# Patient Record
Sex: MALE | Race: WHITE | NOT HISPANIC OR LATINO | URBAN - METROPOLITAN AREA
[De-identification: names, ages, dates, MRNs, and addresses within clinical notes are randomized per-mention and may not be internally consistent; named-entity substitution may affect disease eponyms.]

---

## 2023-04-30 ENCOUNTER — EMERGENCY (EMERGENCY)
Facility: HOSPITAL | Age: 83
LOS: 1 days | Discharge: HOME | End: 2023-04-30
Attending: EMERGENCY MEDICINE
Payer: MEDICARE

## 2023-04-30 VITALS
RESPIRATION RATE: 16 BRPM | WEIGHT: 139.99 LBS | OXYGEN SATURATION: 96 % | HEART RATE: 118 BPM | DIASTOLIC BLOOD PRESSURE: 88 MMHG | SYSTOLIC BLOOD PRESSURE: 141 MMHG | TEMPERATURE: 98 F

## 2023-04-30 VITALS
RESPIRATION RATE: 18 BRPM | SYSTOLIC BLOOD PRESSURE: 138 MMHG | OXYGEN SATURATION: 97 % | TEMPERATURE: 98 F | HEART RATE: 89 BPM | DIASTOLIC BLOOD PRESSURE: 86 MMHG

## 2023-04-30 LAB
ALBUMIN SERPL ELPH-MCNC: 3.6 G/DL — SIGNIFICANT CHANGE UP (ref 3.3–5.2)
ALP SERPL-CCNC: 128 U/L — HIGH (ref 40–120)
ALT FLD-CCNC: 29 U/L — SIGNIFICANT CHANGE UP
ANION GAP SERPL CALC-SCNC: 15 MMOL/L — SIGNIFICANT CHANGE UP (ref 5–17)
AST SERPL-CCNC: 35 U/L — SIGNIFICANT CHANGE UP
BASOPHILS # BLD AUTO: 0.09 K/UL — SIGNIFICANT CHANGE UP (ref 0–0.2)
BASOPHILS NFR BLD AUTO: 0.9 % — SIGNIFICANT CHANGE UP (ref 0–2)
BILIRUB SERPL-MCNC: 0.4 MG/DL — SIGNIFICANT CHANGE UP (ref 0.4–2)
BUN SERPL-MCNC: 45 MG/DL — HIGH (ref 8–20)
CALCIUM SERPL-MCNC: 9.2 MG/DL — SIGNIFICANT CHANGE UP (ref 8.4–10.5)
CHLORIDE SERPL-SCNC: 108 MMOL/L — SIGNIFICANT CHANGE UP (ref 96–108)
CO2 SERPL-SCNC: 17 MMOL/L — LOW (ref 22–29)
CREAT SERPL-MCNC: 2.56 MG/DL — HIGH (ref 0.5–1.3)
EGFR: 24 ML/MIN/1.73M2 — LOW
EOSINOPHIL # BLD AUTO: 0.09 K/UL — SIGNIFICANT CHANGE UP (ref 0–0.5)
EOSINOPHIL NFR BLD AUTO: 0.9 % — SIGNIFICANT CHANGE UP (ref 0–6)
GLUCOSE BLDC GLUCOMTR-MCNC: 128 MG/DL — HIGH (ref 70–99)
GLUCOSE SERPL-MCNC: 88 MG/DL — SIGNIFICANT CHANGE UP (ref 70–99)
HCT VFR BLD CALC: 49.7 % — SIGNIFICANT CHANGE UP (ref 39–50)
HGB BLD-MCNC: 16.7 G/DL — SIGNIFICANT CHANGE UP (ref 13–17)
LYMPHOCYTES # BLD AUTO: 1.58 K/UL — SIGNIFICANT CHANGE UP (ref 1–3.3)
LYMPHOCYTES # BLD AUTO: 15.7 % — SIGNIFICANT CHANGE UP (ref 13–44)
MANUAL SMEAR VERIFICATION: SIGNIFICANT CHANGE UP
MCHC RBC-ENTMCNC: 32.5 PG — SIGNIFICANT CHANGE UP (ref 27–34)
MCHC RBC-ENTMCNC: 33.6 GM/DL — SIGNIFICANT CHANGE UP (ref 32–36)
MCV RBC AUTO: 96.7 FL — SIGNIFICANT CHANGE UP (ref 80–100)
MONOCYTES # BLD AUTO: 1.92 K/UL — HIGH (ref 0–0.9)
MONOCYTES NFR BLD AUTO: 19.1 % — HIGH (ref 2–14)
NEUTROPHILS # BLD AUTO: 6.19 K/UL — SIGNIFICANT CHANGE UP (ref 1.8–7.4)
NEUTROPHILS NFR BLD AUTO: 61.7 % — SIGNIFICANT CHANGE UP (ref 43–77)
PLAT MORPH BLD: NORMAL — SIGNIFICANT CHANGE UP
PLATELET # BLD AUTO: 143 K/UL — LOW (ref 150–400)
POTASSIUM SERPL-MCNC: 5.2 MMOL/L — SIGNIFICANT CHANGE UP (ref 3.5–5.3)
POTASSIUM SERPL-SCNC: 5.2 MMOL/L — SIGNIFICANT CHANGE UP (ref 3.5–5.3)
PROT SERPL-MCNC: 7 G/DL — SIGNIFICANT CHANGE UP (ref 6.6–8.7)
RBC # BLD: 5.14 M/UL — SIGNIFICANT CHANGE UP (ref 4.2–5.8)
RBC # FLD: 15.4 % — HIGH (ref 10.3–14.5)
RBC BLD AUTO: NORMAL — SIGNIFICANT CHANGE UP
SARS-COV-2 RNA SPEC QL NAA+PROBE: SIGNIFICANT CHANGE UP
SMUDGE CELLS # BLD: PRESENT — SIGNIFICANT CHANGE UP
SODIUM SERPL-SCNC: 140 MMOL/L — SIGNIFICANT CHANGE UP (ref 135–145)
VARIANT LYMPHS # BLD: 1.7 % — SIGNIFICANT CHANGE UP (ref 0–6)
WBC # BLD: 10.04 K/UL — SIGNIFICANT CHANGE UP (ref 3.8–10.5)
WBC # FLD AUTO: 10.04 K/UL — SIGNIFICANT CHANGE UP (ref 3.8–10.5)

## 2023-04-30 PROCEDURE — 36415 COLL VENOUS BLD VENIPUNCTURE: CPT

## 2023-04-30 PROCEDURE — 93971 EXTREMITY STUDY: CPT | Mod: 26,LT

## 2023-04-30 PROCEDURE — 96375 TX/PRO/DX INJ NEW DRUG ADDON: CPT

## 2023-04-30 PROCEDURE — 99223 1ST HOSP IP/OBS HIGH 75: CPT | Mod: FS

## 2023-04-30 PROCEDURE — 85025 COMPLETE CBC W/AUTO DIFF WBC: CPT

## 2023-04-30 PROCEDURE — 99284 EMERGENCY DEPT VISIT MOD MDM: CPT | Mod: 25

## 2023-04-30 PROCEDURE — 96374 THER/PROPH/DIAG INJ IV PUSH: CPT

## 2023-04-30 PROCEDURE — 73562 X-RAY EXAM OF KNEE 3: CPT | Mod: 26,LT

## 2023-04-30 PROCEDURE — G0378: CPT

## 2023-04-30 PROCEDURE — 82962 GLUCOSE BLOOD TEST: CPT

## 2023-04-30 PROCEDURE — 93971 EXTREMITY STUDY: CPT

## 2023-04-30 PROCEDURE — 80053 COMPREHEN METABOLIC PANEL: CPT

## 2023-04-30 PROCEDURE — U0005: CPT

## 2023-04-30 PROCEDURE — 93926 LOWER EXTREMITY STUDY: CPT

## 2023-04-30 PROCEDURE — 73562 X-RAY EXAM OF KNEE 3: CPT

## 2023-04-30 PROCEDURE — U0003: CPT

## 2023-04-30 PROCEDURE — 82550 ASSAY OF CK (CPK): CPT

## 2023-04-30 PROCEDURE — 73590 X-RAY EXAM OF LOWER LEG: CPT

## 2023-04-30 PROCEDURE — 93926 LOWER EXTREMITY STUDY: CPT | Mod: 26,LT

## 2023-04-30 PROCEDURE — 73590 X-RAY EXAM OF LOWER LEG: CPT | Mod: 26,LT

## 2023-04-30 RX ORDER — SODIUM BICARBONATE 1 MEQ/ML
650 SYRINGE (ML) INTRAVENOUS EVERY 12 HOURS
Refills: 0 | Status: DISCONTINUED | OUTPATIENT
Start: 2023-04-30 | End: 2023-05-07

## 2023-04-30 RX ORDER — OXYCODONE AND ACETAMINOPHEN 5; 325 MG/1; MG/1
1 TABLET ORAL
Qty: 9 | Refills: 0
Start: 2023-04-30 | End: 2023-05-02

## 2023-04-30 RX ORDER — ATORVASTATIN CALCIUM 80 MG/1
20 TABLET, FILM COATED ORAL AT BEDTIME
Refills: 0 | Status: DISCONTINUED | OUTPATIENT
Start: 2023-04-30 | End: 2023-05-07

## 2023-04-30 RX ORDER — CLOPIDOGREL BISULFATE 75 MG/1
1 TABLET, FILM COATED ORAL
Qty: 30 | Refills: 0
Start: 2023-04-30 | End: 2023-05-29

## 2023-04-30 RX ORDER — ACETAMINOPHEN 500 MG
650 TABLET ORAL EVERY 6 HOURS
Refills: 0 | Status: DISCONTINUED | OUTPATIENT
Start: 2023-04-30 | End: 2023-05-07

## 2023-04-30 RX ORDER — ALLOPURINOL 300 MG
100 TABLET ORAL DAILY
Refills: 0 | Status: DISCONTINUED | OUTPATIENT
Start: 2023-04-30 | End: 2023-05-07

## 2023-04-30 RX ORDER — ONDANSETRON 8 MG/1
4 TABLET, FILM COATED ORAL ONCE
Refills: 0 | Status: COMPLETED | OUTPATIENT
Start: 2023-04-30 | End: 2023-04-30

## 2023-04-30 RX ORDER — ASPIRIN/CALCIUM CARB/MAGNESIUM 324 MG
81 TABLET ORAL DAILY
Refills: 0 | Status: DISCONTINUED | OUTPATIENT
Start: 2023-04-30 | End: 2023-05-07

## 2023-04-30 RX ORDER — LEVOTHYROXINE SODIUM 125 MCG
112 TABLET ORAL DAILY
Refills: 0 | Status: DISCONTINUED | OUTPATIENT
Start: 2023-04-30 | End: 2023-05-07

## 2023-04-30 RX ORDER — OXYCODONE HYDROCHLORIDE 5 MG/1
5 TABLET ORAL ONCE
Refills: 0 | Status: DISCONTINUED | OUTPATIENT
Start: 2023-04-30 | End: 2023-04-30

## 2023-04-30 RX ORDER — SODIUM CHLORIDE 9 MG/ML
1000 INJECTION INTRAMUSCULAR; INTRAVENOUS; SUBCUTANEOUS ONCE
Refills: 0 | Status: COMPLETED | OUTPATIENT
Start: 2023-04-30 | End: 2023-04-30

## 2023-04-30 RX ORDER — CLOPIDOGREL BISULFATE 75 MG/1
75 TABLET, FILM COATED ORAL DAILY
Refills: 0 | Status: DISCONTINUED | OUTPATIENT
Start: 2023-04-30 | End: 2023-05-07

## 2023-04-30 RX ORDER — METOPROLOL TARTRATE 50 MG
25 TABLET ORAL EVERY 8 HOURS
Refills: 0 | Status: DISCONTINUED | OUTPATIENT
Start: 2023-04-30 | End: 2023-05-07

## 2023-04-30 RX ORDER — OXYCODONE HYDROCHLORIDE 5 MG/1
5 TABLET ORAL EVERY 8 HOURS
Refills: 0 | Status: COMPLETED | OUTPATIENT
Start: 2023-04-30 | End: 2023-05-07

## 2023-04-30 RX ORDER — ASPIRIN/CALCIUM CARB/MAGNESIUM 324 MG
81 TABLET ORAL
Refills: 0 | Status: DISCONTINUED | OUTPATIENT
Start: 2023-04-30 | End: 2023-04-30

## 2023-04-30 RX ORDER — ACETAMINOPHEN 500 MG
1000 TABLET ORAL ONCE
Refills: 0 | Status: COMPLETED | OUTPATIENT
Start: 2023-04-30 | End: 2023-04-30

## 2023-04-30 RX ORDER — OXYCODONE HYDROCHLORIDE 5 MG/1
1 TABLET ORAL
Qty: 9 | Refills: 0
Start: 2023-04-30 | End: 2023-05-02

## 2023-04-30 RX ADMIN — Medication 400 MILLIGRAM(S): at 06:27

## 2023-04-30 RX ADMIN — CLOPIDOGREL BISULFATE 75 MILLIGRAM(S): 75 TABLET, FILM COATED ORAL at 11:46

## 2023-04-30 RX ADMIN — OXYCODONE HYDROCHLORIDE 5 MILLIGRAM(S): 5 TABLET ORAL at 03:05

## 2023-04-30 RX ADMIN — ONDANSETRON 4 MILLIGRAM(S): 8 TABLET, FILM COATED ORAL at 09:14

## 2023-04-30 RX ADMIN — Medication 112 MICROGRAM(S): at 10:25

## 2023-04-30 RX ADMIN — OXYCODONE HYDROCHLORIDE 5 MILLIGRAM(S): 5 TABLET ORAL at 14:33

## 2023-04-30 RX ADMIN — OXYCODONE HYDROCHLORIDE 5 MILLIGRAM(S): 5 TABLET ORAL at 04:05

## 2023-04-30 RX ADMIN — Medication 25 MILLIGRAM(S): at 11:47

## 2023-04-30 RX ADMIN — SODIUM CHLORIDE 1000 MILLILITER(S): 9 INJECTION INTRAMUSCULAR; INTRAVENOUS; SUBCUTANEOUS at 04:57

## 2023-04-30 RX ADMIN — Medication 81 MILLIGRAM(S): at 11:46

## 2023-04-30 RX ADMIN — OXYCODONE HYDROCHLORIDE 5 MILLIGRAM(S): 5 TABLET ORAL at 14:00

## 2023-04-30 RX ADMIN — Medication 100 MILLIGRAM(S): at 11:47

## 2023-04-30 NOTE — ED PROVIDER NOTE - OBJECTIVE STATEMENT
83y M w/ hx hypertension, diabetes, PAD s/p LLE stent, CKD, DVT (not on AC); presents for leg pain. Pt reports that he was outside walking today when he developed pain to L leg behind the calf. Denies any trauma. Notes prior hx of DVT in the same leg; also had stent placed in December for PAD. Pain worse with any movement of his leg. Was given tylenol without improvement. No fever or chills, no chest pain or SOB. Normally ambulates with a cane.

## 2023-04-30 NOTE — CONSULT NOTE ADULT - ASSESSMENT
ASSESSMENT: Patient is a 83y old male with PVD s/p LLE stent on 12/2022 who presented with left calf pain after a plane/car ride. US demonstrated patent LLE AT/DP/PT. Patient also has doppler signal in left DP/PT.     PLAN:    - Recommend Aspirin, Plavix, and statin  - f/u with his vascular surgeon this week if possible  - No urgent vascular surgery intervention required at this moment.   - Plan discussed with Attending, Dr. Ferrell

## 2023-04-30 NOTE — ED CDU PROVIDER INITIAL DAY NOTE - CLINICAL SUMMARY MEDICAL DECISION MAKING FREE TEXT BOX
83y M w/ hx hypertension,  hx of diabetes, PAD s/p LLE stent ( 15 yrs ago and recent on 1/2023) , CKD, DVT (not on AC); presents for leg pain. Pt reports that he was outside walking today when he developed pain to L leg behind the knee / calf . Denies any trauma or injury . Notes prior hx of DVT in the same leg; also had stent placed in January for PAD. Pain worse with any movement of his leg. Was given tylenol without improvement. and he had recent air plane travel to [place] for less than 1 hr . No fever or chills, no chest pain or SOB. Normally ambulates with a cane. he denies any abd pain or back pain   consult vascular   Pain control   continue home med   Pt is on metoprolol 25 TID  asa MWF   US DVT negative   PT eval

## 2023-04-30 NOTE — ED CDU PROVIDER INITIAL DAY NOTE - NSICDXPASTMEDICALHX_GEN_ALL_CORE_FT
PAST MEDICAL HISTORY:  CKD (chronic kidney disease)     HTN (hypertension)     Hypothyroid     PAD (peripheral artery disease)

## 2023-04-30 NOTE — ED CDU PROVIDER INITIAL DAY NOTE - ATTENDING APP SHARED VISIT CONTRIBUTION OF CARE
83y M w/ hx HTN, DM, CKD, PAD s/p LLE stent, DVT on ot AC, presents for left calf pain. On exam, pt well appearing and in no acute distress. Weak but Dopplerable left DP pulse compared to right. Pt complaining of pain with movement of leg, but pain not reproducible with not palpation. Arterial/venous duplex studies and X-rays without acute findings. Suspect claudication pain. Pt in too much pain to ambulate or bear weight with cane. Placed in observation pending PT eval and vascular consult.

## 2023-04-30 NOTE — ED PROVIDER NOTE - NS ED ROS FT
Constitutional: no fever, no chills  Eyes: no vision changes  ENT: no nasal congestion, no sore throat  CV: no chest pain  Resp: no cough, no shortness of breath  GI: no abdominal pain, no vomiting, no diarrhea  : no dysuria  MSK: +leg pain  Skin: no rash  Neuro: no headache, no focal weakness, no paresthesias

## 2023-04-30 NOTE — PHYSICAL THERAPY INITIAL EVALUATION ADULT - RANGE OF MOTION EXAMINATION, REHAB EVAL
except left knee 0 to 75 deg due to c/o pain/bilateral upper extremity ROM was WFL (within functional limits)/bilateral lower extremity ROM was WFL (within functional limits)

## 2023-04-30 NOTE — PROVIDER CONTACT NOTE (OTHER) - ASSESSMENT
Pt received on stretcher bed, pt amb, and returned with CBWR in NAD on stretcher.  Pt with Pre 0/10,  session 8 /10, Post 8 /10 pain levels at left LE: RN Aware. Pt presents functionally independent, will not follow.

## 2023-04-30 NOTE — ED PROVIDER NOTE - CLINICAL SUMMARY MEDICAL DECISION MAKING FREE TEXT BOX
83y M w/ hx HTN, DM, CKD, PAD s/p LLE stent, presents for left leg pain. Pt with weak but Dopplerable DP pulse. Arterial/venous duplex studies without acute findings. Suspect claudication pain. Pt in too much pain to ambulate or bear weight. Will check X-rays. Plan for observation for PT eval.

## 2023-04-30 NOTE — ED CDU PROVIDER DISPOSITION NOTE - PATIENT PORTAL LINK FT
You can access the FollowMyHealth Patient Portal offered by [facility] by registering at the following website: [email or web address]. By joining [facility]’s FollowMyHealth portal, you will also be able to view your health information using other applications (apps) compatible with our system.

## 2023-04-30 NOTE — ED CDU PROVIDER DISPOSITION NOTE - NSFOLLOWUPINSTRUCTIONS_ED_ALL_ED_FT
please continue daily medication   start taking asprin daily and taking Plavix as recommended by vascular as well   take Tylenol as nee it for mild pain and Oxycodone for sever pain if you become nauseas take half of the medication    we will call you back if Any changes on the Xray fining   please call and follow up with vacular surgeon within 1 week and bring the results   use the walker to walk and home physical therapy   come back in ER IF any worse of sever pain - foot coldness - changed the color to blue - unable to feel the toes and foot or any new concern   What is peripheral artery disease?  Peripheral artery disease (PAD) is a condition that affects the blood vessels (called arteries) that bring blood to the legs. PAD can cause muscle pain that gets worse with activity and better with rest, called "claudication." PAD can also cause wounds to heal more slowly than usual. This article is only about the leg pain related to PAD.    Normally, blood flows easily through arteries to all parts of the body. But sometimes, fatty clumps called "plaques" build up inside the walls of arteries (figure 1). Plaques can cause arteries to become narrow or blocked. This prevents blood from flowing normally. When muscles do not get enough blood, symptoms can happen.    Some people have a greater chance of getting PAD, such as those who:    ?Smoke    ?Have diabetes    ?Have high cholesterol    ?Have high blood pressure    What are the symptoms of PAD?  PAD often causes pain in the back of the lower leg. The pain usually gets worse with walking or other exercise, and gets better with rest. PAD can also cause pain in the buttocks, thighs, or sometimes in the feet. People who have leg pain can have other symptoms, too, such as:    ?Trouble walking up stairs    ?Trouble with sexual arousal    Symptoms of claudication can be mild or severe, depending on:    ?Which arteries are affected    ?How narrow the arteries are    ?How much activity a person does    Is there a test for PAD?  Yes. Your doctor or nurse can do different tests to find out if you have PAD, and to check how severe it is. They might:    ?Take the blood pressure in your arm and lower leg (just above the ankle) at rest and right after exercise, and compare them    ?Take the blood pressure in other places in your leg (like the thigh)    ?Order a blood vessel imaging test such as an ultrasound, which can show pictures of your leg arteries    How can I help treat my PAD?  To help treat your PAD and prevent it from getting worse, you can:    ?Stop smoking    ?Get your diabetes, high blood pressure, and high cholesterol under control (if you have these conditions)    ?Walking – Doctors recommend that most people with PAD walk every day. Ask your doctor or nurse how best to begin a walking program.    What other treatments might I have?  Along with a walking program and getting medical conditions under control, some people are also treated with medicines. The medicines used to treat PAD can reduce symptoms, increase blood flow to the legs, and help people walk farther without pain. Most doctors ask their patients to try a medicine called cilostazol (brand name: Pletal). But people who have certain heart problems cannot take cilostazol and must take a different medicine.    If you still have severe symptoms after trying medicines, your doctor will talk with you about the possibility of having a procedure to increase blood flow to your legs and feet. Your treatment options might include:    ?Angioplasty or stenting – During angioplasty or stenting, the doctor sends a thin tube with a balloon at the end of it to the part of the artery that is blocked. Then they inflate the balloon to open the blockage. Often the doctor props open the artery using a tiny mesh tube called a "stent," which stays in the body.    ?Bypass surgery – During bypass surgery, the doctor removes a piece of blood vessel (vein) from another part of the body. Then they reattach that piece of blood vessel (called a vein graft) above and below the area that is clogged. This re-routes blood around the clog, and allows it to get to the part of the leg that was not getting enough blood. Sometimes instead of taking a graft from another part of the body, the doctor can use a man-made graft.    What should I know about the different procedures?  Angioplasty and stenting work best for treating blocked areas that are short. Surgery works better long-term for treating blocked areas that are long. People who have the fewest long-term problems after bypass surgery include those who are younger than 70 and do not have diabetes.    Your doctor might recommend a procedure for you, depending on your symptoms, age, and medical problems. But many people can choose which procedure to have. If your doctor offers you a choice, ask:    ?What are the benefits of each procedure for me?    ?What are the downsides of each procedure for me?    ?What happens if I do not have any procedure?

## 2023-04-30 NOTE — ED CDU PROVIDER DISPOSITION NOTE - ATTENDING CONTRIBUTION TO CARE
I, Isra Moore, have personally performed a face to face diagnostic evaluation on this patient. I have reviewed the MARTHA note and agree with the history, exam and plan of care, except as noted.    83y M w/ hx hypertension,  hx of diabetes, PAD s/p LLE stent ( 15 yrs ago and recent on 1/2023) , CKD, DVT (not on AC); presents for leg pain. left foot colder than right, dopperable pulses. Venous duplex showed no DVT. arterial duplex showed decreased flow. patient seen by vascular, will need asa and plavix. patient walked with PT. case management will set up home PT and rolling walker.

## 2023-04-30 NOTE — ED CDU PROVIDER DISPOSITION NOTE - CLINICAL COURSE
83y M w/ hx hypertension,  hx of diabetes, PAD s/p LLE stent ( 15 yrs ago and recent on 1/2023) , CKD, DVT (not on AC); presents for leg pain. Pt reports that he was outside walking today when he developed pain to L leg behind the knee / calf . Denies any trauma or injury . Notes prior hx of DVT in the same leg; also had stent placed in January for PAD. Pain worse with any movement of his leg. Was given Tylenol without improvement. and he had recent air plane travel to [place] for less than 1 hr . No fever or chills, no chest pain or SOB. Normally ambulates with a cane. he denies any abd pain or back pain. pt is seen by vascular surgery recommended asa 81 mg that he takes been told take every day and plavix and  f.u vascular - pt have walked with PT given Rolling walker and HOME PT - RX given - pt educated worsening symptoms and he agreed to come if anything get worse

## 2023-04-30 NOTE — CHART NOTE - NSCHARTNOTEFT_GEN_A_CORE
CCC made aware patient in need of home PT and RW.  RW delivered to the bedside,, consignment form signed and terms of form explained to patient, patient verbalized understanding.  Patient agreeable to home PT.  Referal sent for DME and Home PT...CCC to follow, patient stable for discharge.

## 2023-04-30 NOTE — PHYSICAL THERAPY INITIAL EVALUATION ADULT - ADDITIONAL COMMENTS
Pt reports living with spouse in a house in a senior community with 0 OMID and 0 steps in home. Pt amb with SAC and is independent with functional mobility, ADLs, and IADLs. Pt drives and is retired. Pt has support of family. Pt owns SAC.

## 2023-04-30 NOTE — ED CDU PROVIDER INITIAL DAY NOTE - NSICDXFAMILYHX_GEN_ALL_CORE_FT
FAMILY HISTORY:  Father  Still living? No  FH: Parkinson's disease, Age at diagnosis: Age Unknown

## 2023-04-30 NOTE — ED ADULT TRIAGE NOTE - CHIEF COMPLAINT QUOTE
BIBEMS c/o L leg pain after taking a series of flights today. HX blood clots, states was instructed to stop taking anticoagulants x2 years ago. Patient denies CP/discomfort, difficulty breathing/SOB at triage.

## 2023-04-30 NOTE — ED ADULT NURSE REASSESSMENT NOTE - NS ED NURSE REASSESS COMMENT FT1
assumed care of pt from previous RN LA. Pt a&ox3, able to make needs known. Cm continues,  RR even and unlabored. denies pain/ discomfort at this time.

## 2023-04-30 NOTE — ED PROVIDER NOTE - PHYSICAL EXAMINATION
Constitutional: Awake, alert, in no acute distress  Eyes: no scleral icterus  HENT: normocephalic, atraumatic, moist oral mucosa  Neck: supple  CV: RRR, no murmur  Pulm: non-labored respirations, CTAB  Abdomen: soft, non-tender, non-distended  Extremities: left foot colder compared to right; sensation intact. Dopplerable DP pulses b/l -- weaker on left compared to right. no calf tenderness, no erythema or swelling, no tenderness of knee or tibia/fibula.  Skin: no rash, no jaundice  Neuro: AAOx3, moving all extremities equally

## 2023-04-30 NOTE — ED CDU PROVIDER INITIAL DAY NOTE - PHYSICAL EXAMINATION
Constitutional: Awake, alert, in no acute distress on cardiac monitoring   Eyes: no scleral icterus  HENT: normocephalic, atraumatic, moist oral mucosa  Neck: supple- ROM grossly intact   CV: tachycardic no murmur  Pulm: non-labored respirations, CTAB  Abdomen: soft, non-tender, non-distended  Back: Spine midline and non-tender. No CVAT.  Extremities: left foot colder compared to right; sensation intact. Dopplerable DP pulses b/l -- weaker on left compared to right. no calf tenderness, no erythema or swelling, no tenderness of knee or tibia/fibula.  Skin: no rash, no jaundice  Neuro: AAOx3, moving all extremities equally.

## 2023-04-30 NOTE — CONSULT NOTE ADULT - SUBJECTIVE AND OBJECTIVE BOX
VACULAR SURGERY CONSULT     HPI: 83y Male with PMH of LLE DVT (Not on AC), HTN, DM, CKD, and PVD (S/p LE stent in 12/22 at [facility]) who presented the ED c/o left calf pain. He is a  and was participating on air show activity yesterday where he took multiple car and plane rides that were "uncomfortable to his knees". At the end of the activity he had persistent left calf pain that was not going away. He presented to the ED where and LE US duplex was ordered and demonstrated no DVT and patent left AT/DP/PT vessels. Patient states that pain is slightly better but still persistent. Denies any changes in sensation or motor function of left lower extremity. He is ambulatory at baseline with a cane. Denies fever, chills, nausea, vomiting, chest pain, and sob.     ROS: 10-system review is otherwise negative except HPI above.      PAST MEDICAL & SURGICAL HISTORY:  HTN (hypertension)  PAD (peripheral artery disease)  CKD (chronic kidney disease)  Hypothyroid    FAMILY HISTORY:  FH: Parkinson's disease (Father)    SOCIAL HISTORY:  Denies any toxic habits    ALLERGIES: NKA No Known Allergies    HOME MEDICATIONS:   --------------------------------------------------------------------------------------------  PHYSICAL EXAM:   General: NAD, Lying in bed comfortably  Neuro: A+Ox3  HEENT: EOMI, PERRLA, MMM  Cardio: RRR  Resp: Non labored breathing on RA  GI/Abd: Soft, NT/ND, no rebound/guarding, no masses palpated  Vascular: RLE palpable femoral, DP, and PT. LLE signals in popliteal, DP, and PT arteries. No LLE skin loss nor wounds. Delayed capillary refill to 4 seconds.   Pelvis: stable  Musculoskeletal: Mild tenderness to palpation of left proximal calf. Intact Left foot sensation and motor function.   --------------------------------------------------------------------------------------------    LABS                 16.7   10.04  )----------(  143       ( 30 Apr 2023 04:00 )               49.7      140    |  108    |  45.0   ----------------------------<  88         ( 30 Apr 2023 04:00 )  5.2     |  17.0   |  2.56     Ca    9.2        ( 30 Apr 2023 04:00 )    TPro  7.0    /  Alb  3.6    /  TBili  0.4    /  DBili  x      /  AST  35     /  ALT  29     /  AlkPhos  128    ( 30 Apr 2023 04:00 )    LIVER FUNCTIONS - ( 30 Apr 2023 04:00 )  Alb: 3.6 g/dL / Pro: 7.0 g/dL / ALK PHOS: 128 U/L / ALT: 29 U/L / AST: 35 U/L / GGT: x               CAPILLARY BLOOD GLUCOSE    CARDIAC MARKERS ( 30 Apr 2023 04:00 )  x     / x     / 90 U/L / x     / x        --------------------------------------------------------------------------------------------  IMAGING  < from: US Duplex Arterial Lower Ext Ltd, Left (04.30.23 @ 04:39) >  ACC: [id number] EXAM:  US DPLX LWR EXT [employer] LTD LT   ORDERED BY: DAVID HERRERA     PROCEDURE DATE:  04/30/2023          INTERPRETATION:  [facility] RADIOLOGIST PRELIMINARY REPORT    PROCEDURE INFORMATION:  Exam: US Duplex Left Lower Extremity Arteries Or Arterial Bypass Grafts  Exam date and time: 4/30/2023 4:03 AM  Age: 83 years old  Clinical indication: Calf pain, HX pad S/P stent    TECHNIQUE:  Imaging protocol: Left Real-time duplex scan of the arteries or arterial   bypass  grafts of the left lower extremity with 2-D gray scale, color Doppler   flow and  spectral waveform analysis. Images documented and saved.    COMPARISON:  US LOWER EXTREMITY VENOUS DUPLEX LIMITED LEFT 4/30/2023 3:45 AM    FINDINGS:  Left common femoral artery: No occlusion or significant stenosis. Normal  waveform.  Left superficial femoral artery: No occlusion or significant stenosis.   Normal  waveform.  Left popliteal artery: No occlusion or significant stenosis. Normal   waveform.  Left calf/foot arteries: Left peroneal artery demonstrates decreased peak  systolic velocity with monophasic waveform and pulse elongation   compatible with  peripheral vascular disease. The arteries of left lower extremity   including the  anterior tibial artery/dorsalis pedis artery and the posterior tibial   artery  are widely patent without hemodynamically significant stenosis. Popliteal  artery stent is widely patent.    IMPRESSION:  Left peroneal artery demonstrates decreased peak systolic velocity with  monophasic waveform and pulse elongation compatible with peripheral   vascular  disease. The arteries of left lower extremity including the anterior   tibial  artery/dorsalis pedis artery and the posterior tibial artery are widely   patent  without hemodynamically significant stenosis. Popliteal artery stent is   widely  patent.

## 2023-04-30 NOTE — ED ADULT NURSE NOTE - OBJECTIVE STATEMENT
AxOx4. Patient c/o (L) leg pain after taking a series of flights today. HX blood clots, states was instructed to stop taking anticoagulants x2 years ago. Patient denies CP/discomfort, difficulty breathing/SOB at triage. Patient placed on cardiac monitor, NST with HR of 108. IV placed, labs sent, medicated as per orders.

## 2023-08-10 NOTE — ED CDU PROVIDER INITIAL DAY NOTE - TEMPLATE, MLM
General Eucrisa Counseling: Patient may experience a mild burning sensation during topical application. Eucrisa is not approved in children less than 2 years of age.

## 2023-11-07 ENCOUNTER — OFFICE (OUTPATIENT)
Facility: LOCATION | Age: 83
Setting detail: OPHTHALMOLOGY
End: 2023-11-07
Payer: MEDICARE

## 2023-11-07 ENCOUNTER — RX ONLY (RX ONLY)
Age: 83
End: 2023-11-07

## 2023-11-07 DIAGNOSIS — H26.493: ICD-10-CM

## 2023-11-07 DIAGNOSIS — H02.831: ICD-10-CM

## 2023-11-07 DIAGNOSIS — E11.9: ICD-10-CM

## 2023-11-07 DIAGNOSIS — H02.403: ICD-10-CM

## 2023-11-07 DIAGNOSIS — H43.813: ICD-10-CM

## 2023-11-07 DIAGNOSIS — H18.593: ICD-10-CM

## 2023-11-07 DIAGNOSIS — H02.834: ICD-10-CM

## 2023-11-07 DIAGNOSIS — H35.3131: ICD-10-CM

## 2023-11-07 PROCEDURE — 92014 COMPRE OPH EXAM EST PT 1/>: CPT | Performed by: OPHTHALMOLOGY

## 2023-11-07 PROCEDURE — 92134 CPTRZ OPH DX IMG PST SGM RTA: CPT | Performed by: OPHTHALMOLOGY

## 2023-11-07 ASSESSMENT — CONFRONTATIONAL VISUAL FIELD TEST (CVF)
OS_FINDINGS: FULL
OD_FINDINGS: FULL

## 2023-11-08 PROBLEM — Z96.1 PSEUDOPHAKIA ; BOTH EYES: Status: ACTIVE | Noted: 2023-11-07

## 2023-11-08 PROBLEM — E11.9 TYPE 2 DIABETES MELLITUS WITHOUT COMPLICATIONS ; BOTH EYES: Status: ACTIVE | Noted: 2023-11-07

## 2023-11-08 PROBLEM — H02.834 DERMATOCHALASIS; RIGHT UPPER LID, LEFT UPPER LID: Status: ACTIVE | Noted: 2023-11-07

## 2023-11-08 PROBLEM — H02.831 DERMATOCHALASIS; RIGHT UPPER LID, LEFT UPPER LID: Status: ACTIVE | Noted: 2023-11-07

## 2023-11-08 PROBLEM — H35.3131 AGE RELATED MACULAR DEGENERATION DRY; BOTH EYES: Status: ACTIVE | Noted: 2023-11-07

## 2023-11-08 ASSESSMENT — REFRACTION_CURRENTRX
OS_SPHERE: -0.25
OD_VPRISM_DIRECTION: PROGS
OS_AXIS: 140
OS_CYLINDER: +1.00
OS_ADD: +2.25
OS_OVR_VA: 20/
OD_SPHERE: -1.25
OD_AXIS: 105
OD_OVR_VA: 20/
OD_CYLINDER: +0.50
OS_VPRISM_DIRECTION: PROGS
OD_ADD: +2.25

## 2023-11-08 ASSESSMENT — REFRACTION_AUTOREFRACTION
OS_CYLINDER: +1.50
OD_AXIS: 108
OS_SPHERE: -0.50
OD_SPHERE: -3.00
OS_AXIS: 163
OD_CYLINDER: +1.25

## 2023-11-08 ASSESSMENT — SPHEQUIV_DERIVED
OD_SPHEQUIV: -2.375
OS_SPHEQUIV: 0.25

## 2023-11-08 ASSESSMENT — LID POSITION - DERMATOCHALASIS
OS_DERMATOCHALASIS: 3+
OD_DERMATOCHALASIS: 3+

## 2023-11-08 ASSESSMENT — LID POSITION - PTOSIS
OD_PTOSIS: 2+
OS_PTOSIS: 2+

## 2024-05-03 NOTE — PHYSICAL THERAPY INITIAL EVALUATION ADULT - PERTINENT HX OF CURRENT PROBLEM, REHAB EVAL
-- DO NOT REPLY / DO NOT REPLY ALL --  -- Message is from Engagement Center Operations (ECO) --    General Patient Message: Cait is calling in regard to needing pre op clearance form filled out for upcoming surgery 5/21/24. Please advise    Fax [phone]     Caller Information         Type Contact Phone/Fax    05/03/2024 10:32 AM CDT Phone (Incoming) Michaela [phone]     Dr. Patterson nurse          Alternative phone number: no    Can a detailed message be left? Yes    Message Turnaround:     Is it Working Hours? Yes - Working Hours                      83y M w/ hx hypertension,  hx of diabetes, PAD s/p LLE stent ( 15 yrs ago and recent on 1/2023) , CKD, DVT (not on AC); presents for leg pain. Pt reports that he was outside walking today when he developed pain to L leg behind the knee / calf . Denies any trauma or injury . Notes prior hx of DVT in the same leg; also had stent placed in January for PAD. Pain worse with any movement of his leg. Was given tylenol without improvement and he had recent air plane travel to [place] for less than 1 hr.

## 2024-05-21 ENCOUNTER — OFFICE (OUTPATIENT)
Facility: LOCATION | Age: 84
Setting detail: OPHTHALMOLOGY
End: 2024-05-21
Payer: MEDICARE

## 2024-05-21 ENCOUNTER — RX ONLY (RX ONLY)
Age: 84
End: 2024-05-21

## 2024-05-21 DIAGNOSIS — H02.834: ICD-10-CM

## 2024-05-21 DIAGNOSIS — H43.813: ICD-10-CM

## 2024-05-21 DIAGNOSIS — H26.493: ICD-10-CM

## 2024-05-21 DIAGNOSIS — H02.831: ICD-10-CM

## 2024-05-21 DIAGNOSIS — E11.9: ICD-10-CM

## 2024-05-21 DIAGNOSIS — H35.3131: ICD-10-CM

## 2024-05-21 DIAGNOSIS — H18.593: ICD-10-CM

## 2024-05-21 DIAGNOSIS — H02.403: ICD-10-CM

## 2024-05-21 PROCEDURE — 92250 FUNDUS PHOTOGRAPHY W/I&R: CPT | Performed by: OPHTHALMOLOGY

## 2024-05-21 PROCEDURE — 92014 COMPRE OPH EXAM EST PT 1/>: CPT | Performed by: OPHTHALMOLOGY

## 2024-05-21 ASSESSMENT — LID POSITION - DERMATOCHALASIS
OS_DERMATOCHALASIS: 3+
OD_DERMATOCHALASIS: 3+

## 2024-05-21 ASSESSMENT — CONFRONTATIONAL VISUAL FIELD TEST (CVF)
OS_FINDINGS: FULL
OD_FINDINGS: FULL

## 2024-05-21 ASSESSMENT — LID POSITION - PTOSIS
OD_PTOSIS: 2+
OS_PTOSIS: 2+

## 2024-10-17 ENCOUNTER — OFFICE (OUTPATIENT)
Facility: LOCATION | Age: 84
Setting detail: OPHTHALMOLOGY
End: 2024-10-17
Payer: MEDICARE

## 2024-10-17 DIAGNOSIS — H11.32: ICD-10-CM

## 2024-10-17 DIAGNOSIS — S05.02XA: ICD-10-CM

## 2024-10-17 PROBLEM — H11.823 CONJUNCTIVOCHALASIS; BOTH EYES: Status: ACTIVE | Noted: 2024-10-17

## 2024-10-17 PROCEDURE — 99213 OFFICE O/P EST LOW 20 MIN: CPT | Performed by: OPHTHALMOLOGY

## 2024-10-17 ASSESSMENT — REFRACTION_CURRENTRX
OD_CYLINDER: +0.25
OS_ADD: +2.50
OD_SPHERE: -1.50
OS_AXIS: 021
OD_AXIS: 031
OS_SPHERE: -0.75
OD_AXIS: 115
OD_ADD: +2.00
OD_VPRISM_DIRECTION: PROGS
OS_SPHERE: -0.75
OD_SPHERE: -1.25
OD_ADD: +2.50
OS_CYLINDER: +0.75
OD_VPRISM_DIRECTION: PROGS
OD_OVR_VA: 20/
OS_VPRISM_DIRECTION: PROGS
OS_AXIS: 147
OD_OVR_VA: 20/
OS_CYLINDER: +0.50
OS_VPRISM_DIRECTION: PROGS
OS_OVR_VA: 20/
OS_OVR_VA: 20/
OS_ADD: +2.00
OD_CYLINDER: +0.75

## 2024-10-17 ASSESSMENT — LID POSITION - PTOSIS
OS_PTOSIS: 2+
OD_PTOSIS: 2+

## 2024-10-17 ASSESSMENT — LID POSITION - DERMATOCHALASIS
OS_DERMATOCHALASIS: 3+
OD_DERMATOCHALASIS: 3+

## 2024-10-17 ASSESSMENT — VISUAL ACUITY
OD_BCVA: 20/60-2
OS_BCVA: 20/60-2

## 2024-10-17 ASSESSMENT — REFRACTION_AUTOREFRACTION
OD_SPHERE: UNABLE
OS_SPHERE: UNABLE

## 2024-11-19 ENCOUNTER — OFFICE (OUTPATIENT)
Facility: LOCATION | Age: 84
Setting detail: OPHTHALMOLOGY
End: 2024-11-19
Payer: MEDICARE

## 2024-11-19 DIAGNOSIS — H02.403: ICD-10-CM

## 2024-11-19 DIAGNOSIS — H43.813: ICD-10-CM

## 2024-11-19 DIAGNOSIS — H35.3131: ICD-10-CM

## 2024-11-19 DIAGNOSIS — E11.9: ICD-10-CM

## 2024-11-19 DIAGNOSIS — H52.4: ICD-10-CM

## 2024-11-19 PROBLEM — H52.13 MYOPIA; BOTH EYES: Status: ACTIVE | Noted: 2024-11-19

## 2024-11-19 PROCEDURE — 92014 COMPRE OPH EXAM EST PT 1/>: CPT | Performed by: OPHTHALMOLOGY

## 2024-11-19 PROCEDURE — 92134 CPTRZ OPH DX IMG PST SGM RTA: CPT | Performed by: OPHTHALMOLOGY

## 2024-11-19 PROCEDURE — 92015 DETERMINE REFRACTIVE STATE: CPT | Mod: GA | Performed by: OPHTHALMOLOGY

## 2024-11-19 ASSESSMENT — LID POSITION - PTOSIS
OD_PTOSIS: 2+
OS_PTOSIS: 2+

## 2024-11-19 ASSESSMENT — LID POSITION - DERMATOCHALASIS
OS_DERMATOCHALASIS: 3+
OD_DERMATOCHALASIS: 3+

## 2024-11-19 ASSESSMENT — CONFRONTATIONAL VISUAL FIELD TEST (CVF)
OD_FINDINGS: FULL
OS_FINDINGS: FULL

## 2024-11-21 ASSESSMENT — REFRACTION_MANIFEST
OS_ADD: +3.00
OS_CYLINDER: +0.50
OS_AXIS: 015
OD_VA1: 20/40
OS_VA1: 20/30-2
OD_SPHERE: -1.50
OS_SPHERE: -0.50
OU_VA: 20/30+2
OD_ADD: +3.00
OD_CYLINDER: SPHERE

## 2024-11-21 ASSESSMENT — REFRACTION_CURRENTRX
OD_CYLINDER: +0.50
OD_OVR_VA: 20/
OD_AXIS: 103
OD_OVR_VA: 20/
OS_VPRISM_DIRECTION: PROGS
OS_OVR_VA: 20/
OS_CYLINDER: +0.75
OS_SPHERE: +0.75
OD_ADD: +1.75
OS_VPRISM_DIRECTION: PROGS
OS_OVR_VA: 20/
OD_SPHERE: -1.50
OD_VPRISM_DIRECTION: PROGS
OS_ADD: +1.75
OD_VPRISM_DIRECTION: PROGS
OS_AXIS: 143

## 2024-11-21 ASSESSMENT — REFRACTION_AUTOREFRACTION
OD_SPHERE: UNABLE
OS_SPHERE: UNABLE

## 2024-11-21 ASSESSMENT — VISUAL ACUITY
OD_BCVA: 20/60-2
OS_BCVA: 20/50

## 2025-01-13 ENCOUNTER — APPOINTMENT (OUTPATIENT)
Dept: ORTHOPEDIC SURGERY | Facility: CLINIC | Age: 85
End: 2025-01-13
Payer: MEDICARE

## 2025-01-13 VITALS — BODY MASS INDEX: 29.82 KG/M2 | HEIGHT: 67 IN | WEIGHT: 190 LBS

## 2025-01-13 DIAGNOSIS — M25.562 PAIN IN LEFT KNEE: ICD-10-CM

## 2025-01-13 DIAGNOSIS — I10 ESSENTIAL (PRIMARY) HYPERTENSION: ICD-10-CM

## 2025-01-13 DIAGNOSIS — M54.16 RADICULOPATHY, LUMBAR REGION: ICD-10-CM

## 2025-01-13 DIAGNOSIS — E78.00 PURE HYPERCHOLESTEROLEMIA, UNSPECIFIED: ICD-10-CM

## 2025-01-13 DIAGNOSIS — G89.29 PAIN IN LEFT KNEE: ICD-10-CM

## 2025-01-13 DIAGNOSIS — Z86.79 PERSONAL HISTORY OF OTHER DISEASES OF THE CIRCULATORY SYSTEM: ICD-10-CM

## 2025-01-13 DIAGNOSIS — M17.12 UNILATERAL PRIMARY OSTEOARTHRITIS, LEFT KNEE: ICD-10-CM

## 2025-01-13 PROBLEM — Z00.00 ENCOUNTER FOR PREVENTIVE HEALTH EXAMINATION: Status: ACTIVE | Noted: 2025-01-13

## 2025-01-13 PROCEDURE — 73564 X-RAY EXAM KNEE 4 OR MORE: CPT | Mod: LT

## 2025-01-13 PROCEDURE — 72100 X-RAY EXAM L-S SPINE 2/3 VWS: CPT

## 2025-01-13 PROCEDURE — 99204 OFFICE O/P NEW MOD 45 MIN: CPT

## 2025-01-13 RX ORDER — ALLOPURINOL 100 MG/1
100 TABLET ORAL
Refills: 0 | Status: ACTIVE | COMMUNITY

## 2025-01-13 RX ORDER — LIDOCAINE 5% 700 MG/1
5 PATCH TOPICAL
Qty: 90 | Refills: 2 | Status: ACTIVE | COMMUNITY
Start: 2025-01-13 | End: 1900-01-01

## 2025-01-13 RX ORDER — LEVOTHYROXINE SODIUM 0.11 MG/1
112 TABLET ORAL
Refills: 0 | Status: ACTIVE | COMMUNITY

## 2025-01-13 RX ORDER — TRAMADOL HYDROCHLORIDE AND ACETAMINOPHEN 37.5; 325 MG/1; MG/1
37.5-325 TABLET, FILM COATED ORAL EVERY 4 HOURS
Qty: 20 | Refills: 0 | Status: ACTIVE | COMMUNITY
Start: 2025-01-13 | End: 1900-01-01

## 2025-01-13 RX ORDER — METOPROLOL SUCCINATE 25 MG/1
25 TABLET, EXTENDED RELEASE ORAL
Refills: 0 | Status: ACTIVE | COMMUNITY

## 2025-01-13 RX ORDER — ATORVASTATIN CALCIUM 20 MG/1
20 TABLET, FILM COATED ORAL
Refills: 0 | Status: ACTIVE | COMMUNITY

## 2025-01-13 RX ORDER — SODIUM BICARBONATE 650 MG/1
TABLET ORAL
Refills: 0 | Status: ACTIVE | COMMUNITY

## 2025-01-24 ENCOUNTER — RESULT REVIEW (OUTPATIENT)
Age: 85
End: 2025-01-24

## 2025-01-31 ENCOUNTER — APPOINTMENT (OUTPATIENT)
Dept: ORTHOPEDIC SURGERY | Facility: CLINIC | Age: 85
End: 2025-01-31

## 2025-01-31 PROCEDURE — 99214 OFFICE O/P EST MOD 30 MIN: CPT | Mod: 93

## 2025-02-17 ENCOUNTER — APPOINTMENT (OUTPATIENT)
Dept: PAIN MANAGEMENT | Facility: CLINIC | Age: 85
End: 2025-02-17
Payer: MEDICARE

## 2025-02-17 VITALS — HEIGHT: 67 IN | BODY MASS INDEX: 30.61 KG/M2 | WEIGHT: 195 LBS

## 2025-02-17 DIAGNOSIS — M54.16 RADICULOPATHY, LUMBAR REGION: ICD-10-CM

## 2025-02-17 PROCEDURE — 99203 OFFICE O/P NEW LOW 30 MIN: CPT

## 2025-05-05 NOTE — ED CDU PROVIDER INITIAL DAY NOTE - OBJECTIVE STATEMENT
"Regardinw2d pregnant/spotting  ----- Message from Omayra RIOS sent at 2025  8:56 PM EDT -----  \"I am 24w2d pregnant and I started spotting.\"    " 83y M w/ hx hypertension,  hx of diabetes, PAD s/p LLE stent ( 15 yrs ago and recent on 1/2023) , CKD, DVT (not on AC); presents for leg pain. Pt reports that he was outside walking today when he developed pain to L leg behind the knee / calf . Denies any trauma or injury . Notes prior hx of DVT in the same leg; also had stent placed in January for PAD. Pain worse with any movement of his leg. Was given tylenol without improvement. and he had recent air plane travel to [place] for less than 1 hr . No fever or chills, no chest pain or SOB. Normally ambulates with a cane. he denies any abd pain or back pain

## 2025-05-20 ENCOUNTER — OFFICE (OUTPATIENT)
Facility: LOCATION | Age: 85
Setting detail: OPHTHALMOLOGY
End: 2025-05-20
Payer: MEDICARE

## 2025-05-20 DIAGNOSIS — H02.403: ICD-10-CM

## 2025-05-20 DIAGNOSIS — H26.493: ICD-10-CM

## 2025-05-20 DIAGNOSIS — H35.3131: ICD-10-CM

## 2025-05-20 DIAGNOSIS — H34.12: ICD-10-CM

## 2025-05-20 DIAGNOSIS — E11.9: ICD-10-CM

## 2025-05-20 PROCEDURE — 92250 FUNDUS PHOTOGRAPHY W/I&R: CPT | Performed by: OPHTHALMOLOGY

## 2025-05-20 PROCEDURE — 92014 COMPRE OPH EXAM EST PT 1/>: CPT | Performed by: OPHTHALMOLOGY

## 2025-05-20 ASSESSMENT — LID POSITION - DERMATOCHALASIS
OD_DERMATOCHALASIS: 3+
OS_DERMATOCHALASIS: 3+

## 2025-05-20 ASSESSMENT — LID POSITION - PTOSIS
OS_PTOSIS: 2+
OD_PTOSIS: 2+

## 2025-05-20 ASSESSMENT — CONFRONTATIONAL VISUAL FIELD TEST (CVF)
OS_FINDINGS: FULL
OD_FINDINGS: FULL

## 2025-05-21 PROBLEM — H35.3131 AGE RELATED MACULAR DEGENERATION DRY; BOTH EYES: Status: ACTIVE | Noted: 2025-05-20

## 2025-05-22 PROBLEM — H34.12 CENTRAL RETINAL ARTERY OCCLUSION; LEFT EYE: Status: ACTIVE | Noted: 2025-05-20

## 2025-05-22 ASSESSMENT — VISUAL ACUITY
OD_BCVA: 20/40
OS_BCVA: 20/40-2

## 2025-05-22 ASSESSMENT — REFRACTION_CURRENTRX
OD_OVR_VA: 20/
OS_ADD: +2.00
OS_VPRISM_DIRECTION: PROGS
OS_CYLINDER: +1.00
OD_ADD: +2.00
OS_OVR_VA: 20/
OS_AXIS: 143
OD_SPHERE: -1.50
OD_AXIS: 106
OS_SPHERE: +0.25
OD_CYLINDER: +0.50
OD_VPRISM_DIRECTION: PROGS

## 2025-05-22 ASSESSMENT — REFRACTION_MANIFEST
OS_AXIS: 015
OD_ADD: +3.00
OD_CYLINDER: SPHERE
OS_VA1: 20/30-2
OS_SPHERE: -0.50
OD_VA1: 20/40
OD_SPHERE: -1.50
OS_ADD: +3.00
OU_VA: 20/30+2
OS_CYLINDER: +0.50

## 2025-05-22 ASSESSMENT — REFRACTION_AUTOREFRACTION
OD_SPHERE: UNABLE
OS_SPHERE: UNABLE

## 2025-07-08 ENCOUNTER — OFFICE (OUTPATIENT)
Facility: LOCATION | Age: 85
Setting detail: OPHTHALMOLOGY
End: 2025-07-08
Payer: MEDICARE

## 2025-07-08 DIAGNOSIS — E11.9: ICD-10-CM

## 2025-07-08 DIAGNOSIS — H40.1131: ICD-10-CM

## 2025-07-08 DIAGNOSIS — H34.8112: ICD-10-CM

## 2025-07-08 PROBLEM — H43.813 POSTERIOR VITREOUS DETACHMENT; BOTH EYES: Status: ACTIVE | Noted: 2025-07-08

## 2025-07-08 PROBLEM — H35.313 AGE RELATED MACULAR DEGENERATION DRY; BOTH EYES: Status: ACTIVE | Noted: 2025-07-08

## 2025-07-08 PROBLEM — H35.363 DRUSEN; BOTH EYES: Status: ACTIVE | Noted: 2025-07-08

## 2025-07-08 PROCEDURE — 99213 OFFICE O/P EST LOW 20 MIN: CPT | Performed by: OPHTHALMOLOGY

## 2025-07-08 PROCEDURE — 92250 FUNDUS PHOTOGRAPHY W/I&R: CPT | Performed by: OPHTHALMOLOGY

## 2025-07-08 ASSESSMENT — PACHYMETRY
OS_CT_UM: 614
OD_CT_CORRECTION: -6
OD_CT_UM: 632
OS_CT_CORRECTION: -5

## 2025-07-08 ASSESSMENT — LID POSITION - DERMATOCHALASIS
OD_DERMATOCHALASIS: 3+
OS_DERMATOCHALASIS: 3+

## 2025-07-08 ASSESSMENT — CONFRONTATIONAL VISUAL FIELD TEST (CVF)
OS_FINDINGS: FULL
OD_FINDINGS: FULL

## 2025-07-08 ASSESSMENT — LID POSITION - PTOSIS
OS_PTOSIS: 2+
OD_PTOSIS: 2+

## 2025-07-08 ASSESSMENT — TONOMETRY: OS_IOP_MMHG: 21

## 2025-07-11 PROBLEM — H34.8112 CRVO; RIGHT EYE STABLE: Status: ACTIVE | Noted: 2025-07-08

## 2025-07-11 ASSESSMENT — REFRACTION_CURRENTRX
OD_OVR_VA: 20/
OD_ADD: +2.00
OS_SPHERE: +0.25
OS_CYLINDER: +1.00
OD_CYLINDER: +0.50
OS_OVR_VA: 20/
OS_VPRISM_DIRECTION: PROGS
OD_AXIS: 106
OD_VPRISM_DIRECTION: PROGS
OD_SPHERE: -1.50
OS_ADD: +2.00
OS_AXIS: 143

## 2025-07-11 ASSESSMENT — REFRACTION_MANIFEST
OS_CYLINDER: +0.50
OD_SPHERE: -1.50
OS_ADD: +3.00
OS_VA1: 20/30-2
OD_VA1: 20/40
OS_AXIS: 015
OS_SPHERE: -0.50
OU_VA: 20/30+2
OD_ADD: +3.00
OD_CYLINDER: SPHERE

## 2025-07-11 ASSESSMENT — REFRACTION_AUTOREFRACTION
OS_SPHERE: UNABLE
OD_SPHERE: UNABLE

## 2025-07-11 ASSESSMENT — VISUAL ACUITY
OS_BCVA: 20/50+2
OD_BCVA: 20/40

## 2025-08-05 ENCOUNTER — RX ONLY (RX ONLY)
Age: 85
End: 2025-08-05

## 2025-08-05 ENCOUNTER — OFFICE (OUTPATIENT)
Facility: LOCATION | Age: 85
Setting detail: OPHTHALMOLOGY
End: 2025-08-05
Payer: MEDICARE

## 2025-08-05 DIAGNOSIS — E11.9: ICD-10-CM

## 2025-08-05 DIAGNOSIS — H34.8112: ICD-10-CM

## 2025-08-05 DIAGNOSIS — H40.1131: ICD-10-CM

## 2025-08-05 PROCEDURE — 92250 FUNDUS PHOTOGRAPHY W/I&R: CPT | Performed by: OPHTHALMOLOGY

## 2025-08-05 PROCEDURE — 99213 OFFICE O/P EST LOW 20 MIN: CPT | Performed by: OPHTHALMOLOGY

## 2025-08-05 ASSESSMENT — CONFRONTATIONAL VISUAL FIELD TEST (CVF)
OD_FINDINGS: FULL
OS_FINDINGS: FULL

## 2025-08-05 ASSESSMENT — LID POSITION - PTOSIS
OD_PTOSIS: 2+
OS_PTOSIS: 2+

## 2025-08-05 ASSESSMENT — LID POSITION - DERMATOCHALASIS
OD_DERMATOCHALASIS: 3+
OS_DERMATOCHALASIS: 3+

## 2025-08-06 ASSESSMENT — REFRACTION_MANIFEST
OS_ADD: +3.00
OU_VA: 20/30+2
OD_ADD: +3.00
OD_CYLINDER: SPHERE
OD_VA1: 20/40
OS_VA1: 20/30-2
OD_SPHERE: -1.50
OS_CYLINDER: +0.50
OS_SPHERE: -0.50
OS_AXIS: 015

## 2025-08-06 ASSESSMENT — REFRACTION_CURRENTRX
OD_CYLINDER: +0.50
OD_SPHERE: -1.50
OS_SPHERE: +0.25
OS_ADD: +2.00
OS_VPRISM_DIRECTION: PROGS
OD_ADD: +2.00
OS_AXIS: 143
OS_CYLINDER: +1.00
OD_OVR_VA: 20/
OS_OVR_VA: 20/
OD_VPRISM_DIRECTION: PROGS
OD_AXIS: 106

## 2025-08-06 ASSESSMENT — VISUAL ACUITY
OD_BCVA: 20/40-2
OS_BCVA: 20/40-2

## 2025-08-06 ASSESSMENT — REFRACTION_AUTOREFRACTION
OD_SPHERE: UNABLE
OS_SPHERE: UNABLE